# Patient Record
Sex: FEMALE | Race: WHITE | NOT HISPANIC OR LATINO | Employment: FULL TIME | URBAN - METROPOLITAN AREA
[De-identification: names, ages, dates, MRNs, and addresses within clinical notes are randomized per-mention and may not be internally consistent; named-entity substitution may affect disease eponyms.]

---
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
## 2025-01-07 ENCOUNTER — OFFICE VISIT (OUTPATIENT)
Dept: OBSTETRICS AND GYNECOLOGY | Facility: CLINIC | Age: 20
End: 2025-01-07
Payer: MEDICAID

## 2025-01-07 VITALS — SYSTOLIC BLOOD PRESSURE: 102 MMHG | DIASTOLIC BLOOD PRESSURE: 68 MMHG | WEIGHT: 199 LBS

## 2025-01-07 DIAGNOSIS — Z30.014 ENCOUNTER FOR INITIAL PRESCRIPTION OF INTRAUTERINE CONTRACEPTIVE DEVICE (IUD): ICD-10-CM

## 2025-01-07 PROBLEM — Z34.91 ENCOUNTER FOR SUPERVISION OF NORMAL PREGNANCY IN FIRST TRIMESTER: Status: RESOLVED | Noted: 2024-05-30 | Resolved: 2025-01-07

## 2025-01-07 PROCEDURE — 3074F SYST BP LT 130 MM HG: CPT | Mod: CPTII,,, | Performed by: STUDENT IN AN ORGANIZED HEALTH CARE EDUCATION/TRAINING PROGRAM

## 2025-01-07 PROCEDURE — 1159F MED LIST DOCD IN RCRD: CPT | Mod: CPTII,,, | Performed by: STUDENT IN AN ORGANIZED HEALTH CARE EDUCATION/TRAINING PROGRAM

## 2025-01-07 PROCEDURE — 3078F DIAST BP <80 MM HG: CPT | Mod: CPTII,,, | Performed by: STUDENT IN AN ORGANIZED HEALTH CARE EDUCATION/TRAINING PROGRAM

## 2025-01-07 PROCEDURE — 1160F RVW MEDS BY RX/DR IN RCRD: CPT | Mod: CPTII,,, | Performed by: STUDENT IN AN ORGANIZED HEALTH CARE EDUCATION/TRAINING PROGRAM

## 2025-01-07 NOTE — PROGRESS NOTES
Patient is a 19-year-old white female status post vaginal delivery present clinic for postpartum visit.  She is doing well today.  She is still having some vaginal bleeding from her delivery.  She is breast and formula feeding.  She is interested in the copper IUD for contraception.  She also reports some mild dysuria.  She has no other complaints today.  She denies VD/Denies any HA, vision changes, F/C, LE swelling. Denies any breast pain/soreness. Denies postpartum depression.     Vitals:    01/07/25 1508   BP: 102/68   Weight: 90.3 kg (199 lb)     There is no height or weight on file to calculate BMI.  Gen:WDWN in NAD  NC/AT  Non labored breathing  Abd soft, NT/ND  Pelvic NFEG no lesions no discharge  Vaginal walls pink moist well rugated no lesions  Skin: warm and dry  Ext no edema      Patient Active Problem List   Diagnosis    Encounter for supervision of normal pregnancy in first trimester    Rubella non-immune status, antepartum     Plan   Patient doing well postpartum   Patient released from postpartum restrictions   Patient counseled on contraception, she desires the copper IUD she has had this in the past with good results   ParaGard was ordered today, notify patient of arrival   Return to clinic for IUD insertion

## 2025-01-09 ENCOUNTER — PATIENT MESSAGE (OUTPATIENT)
Dept: OBSTETRICS AND GYNECOLOGY | Facility: CLINIC | Age: 20
End: 2025-01-09
Payer: MEDICAID

## 2025-05-19 ENCOUNTER — OFFICE VISIT (OUTPATIENT)
Dept: OBSTETRICS AND GYNECOLOGY | Facility: CLINIC | Age: 20
End: 2025-05-19
Payer: MEDICAID

## 2025-05-19 VITALS
BODY MASS INDEX: 41.82 KG/M2 | SYSTOLIC BLOOD PRESSURE: 113 MMHG | DIASTOLIC BLOOD PRESSURE: 70 MMHG | HEIGHT: 60 IN | HEART RATE: 76 BPM | WEIGHT: 213 LBS

## 2025-05-19 DIAGNOSIS — Z30.017 ENCOUNTER FOR INITIAL PRESCRIPTION OF IMPLANTABLE SUBDERMAL CONTRACEPTIVE: ICD-10-CM

## 2025-05-19 DIAGNOSIS — Z01.419 WELL WOMAN EXAM WITH ROUTINE GYNECOLOGICAL EXAM: Primary | ICD-10-CM

## 2025-05-19 RX ORDER — CYCLOBENZAPRINE HCL 5 MG
5 TABLET ORAL
COMMUNITY
Start: 2025-03-25

## 2025-05-19 RX ORDER — NITROFURANTOIN 25; 75 MG/1; MG/1
CAPSULE ORAL
COMMUNITY
Start: 2025-04-25

## 2025-05-19 RX ORDER — NORGESTIMATE AND ETHINYL ESTRADIOL 0.25-0.035
1 KIT ORAL
COMMUNITY
Start: 2025-04-17

## 2025-05-19 RX ORDER — IBUPROFEN 800 MG/1
TABLET, FILM COATED ORAL
COMMUNITY
Start: 2024-12-11

## 2025-05-19 RX ORDER — CHLORHEXIDINE GLUCONATE ORAL RINSE 1.2 MG/ML
SOLUTION DENTAL
COMMUNITY
Start: 2025-03-28

## 2025-05-19 NOTE — PROGRESS NOTES
Chief Complaint: Annual Exam    HPI: 20 y.o.  here for Annual Exam.  Patient doing well today.  She is interested in starting contraception.  She did miss her cycle in April, UPT is negative today in clinic.  We discussed the different types of contraception she is interested in the Nexplanon at this time.  She has no other complaints today.    Review of Systems   Constitutional:  Negative for chills and fever.   HENT:  Negative for congestion and sore throat.    Respiratory:  Negative for cough and shortness of breath.    Cardiovascular:  Negative for chest pain and palpitations.   Gastrointestinal:  Negative for abdominal pain, nausea and vomiting.   Genitourinary:  Negative for dysuria, frequency and urgency.   Musculoskeletal:  Negative for back pain.   Skin:  Negative for itching and rash.   Neurological:  Negative for headaches.   All other systems reviewed and are negative.      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   Contraception History: none  Social History: denies t/d/e  Family History denies breast, colon, ovarian, or uterine cancer  Review of patient's allergies indicates:   Allergen Reactions    Tramadol      Current Outpatient Medications   Medication Instructions    chlorhexidine (PERIDEX) 0.12 % solution SWISH 15ML FOR 30 SECONDS THEN SPIT OUT TWICE DAILY AS NEEDED FOR 7 DAYS    cyclobenzaprine (FLEXERIL) 5 mg    EScitalopram oxalate (LEXAPRO) 10 mg, Oral, Daily    ESTARYLLA 0.25-0.035 mg per tablet 1 tablet    ibuprofen (ADVIL,MOTRIN) 800 MG tablet     nitrofurantoin, macrocrystal-monohydrate, (MACROBID) 100 MG capsule TAKE 1 CAPSULE BY MOUTH EVERY 12 HOURS FOR 7 DAYS    prenatal vit37/iron/folic acid (PRENATA ORAL) 1 tablet, Daily     Physical Exam:  Vitals:     25 1040   BP: 113/70   Pulse: 76     Body mass index is 41.6 kg/m².  Physical Exam  Vitals reviewed. Exam conducted with a chaperone present.   Constitutional:       General: She is not in acute distress.     Appearance: Normal appearance. She is obese.   HENT:      Head: Normocephalic and atraumatic.   Eyes:      Extraocular Movements: Extraocular movements intact.      Pupils: Pupils are equal, round, and reactive to light.   Pulmonary:      Effort: Pulmonary effort is normal. No respiratory distress.   Abdominal:      General: There is no distension.      Palpations: Abdomen is soft.      Tenderness: There is no abdominal tenderness.   Musculoskeletal:         General: No swelling or tenderness. Normal range of motion.      Cervical back: Normal range of motion and neck supple.   Skin:     General: Skin is warm and dry.   Neurological:      General: No focal deficit present.      Mental Status: She is alert and oriented to person, place, and time.        ASSESSMENT:   Patient is a 20 y.o.  who presents for annual exam   Problem List[1]    PLAN:  Pap not indicated, will need next year  We discussed different types of contraception today, she is electing for the Nexplanon   Paperwork filled out today, notify patient of arrival scheduled for insertion  Pre-conception counseling- folic acid and PNV  Counseling on self-breast exams, diet, and exercise.  Return to clinic for Nexplanon insertion             [1]   Patient Active Problem List  Diagnosis    Rubella non-immune status, antepartum

## 2025-06-05 ENCOUNTER — TELEPHONE (OUTPATIENT)
Dept: OBSTETRICS AND GYNECOLOGY | Facility: CLINIC | Age: 20
End: 2025-06-05
Payer: MEDICAID

## 2025-06-12 ENCOUNTER — OFFICE VISIT (OUTPATIENT)
Dept: OBSTETRICS AND GYNECOLOGY | Facility: CLINIC | Age: 20
End: 2025-06-12
Payer: MEDICAID

## 2025-06-12 VITALS
HEART RATE: 66 BPM | BODY MASS INDEX: 41.56 KG/M2 | DIASTOLIC BLOOD PRESSURE: 79 MMHG | WEIGHT: 212.81 LBS | SYSTOLIC BLOOD PRESSURE: 115 MMHG

## 2025-06-12 DIAGNOSIS — N83.202 CYSTS OF BOTH OVARIES: Primary | ICD-10-CM

## 2025-06-12 DIAGNOSIS — N83.201 CYSTS OF BOTH OVARIES: Primary | ICD-10-CM

## 2025-06-12 PROCEDURE — 99213 OFFICE O/P EST LOW 20 MIN: CPT | Mod: S$PBB,,, | Performed by: STUDENT IN AN ORGANIZED HEALTH CARE EDUCATION/TRAINING PROGRAM

## 2025-06-12 PROCEDURE — 3074F SYST BP LT 130 MM HG: CPT | Mod: CPTII,,, | Performed by: STUDENT IN AN ORGANIZED HEALTH CARE EDUCATION/TRAINING PROGRAM

## 2025-06-12 PROCEDURE — 3008F BODY MASS INDEX DOCD: CPT | Mod: CPTII,,, | Performed by: STUDENT IN AN ORGANIZED HEALTH CARE EDUCATION/TRAINING PROGRAM

## 2025-06-12 PROCEDURE — 3078F DIAST BP <80 MM HG: CPT | Mod: CPTII,,, | Performed by: STUDENT IN AN ORGANIZED HEALTH CARE EDUCATION/TRAINING PROGRAM

## 2025-06-12 PROCEDURE — 1159F MED LIST DOCD IN RCRD: CPT | Mod: CPTII,,, | Performed by: STUDENT IN AN ORGANIZED HEALTH CARE EDUCATION/TRAINING PROGRAM

## 2025-06-12 PROCEDURE — 1160F RVW MEDS BY RX/DR IN RCRD: CPT | Mod: CPTII,,, | Performed by: STUDENT IN AN ORGANIZED HEALTH CARE EDUCATION/TRAINING PROGRAM

## 2025-06-12 NOTE — PROGRESS NOTES
Chief Complaint: ovarian cyst    HPI: Patient is a 20 y.o. y.o. female  who presents to GYN clinic for follow up.  Patient was seen in the ED with complaints of worsening right-sided pelvic pain.  She reports the pain wraps around to her back.  She would have a CT scan which showed a difficult positive for 2 x 2 cm right adnexal cystic area.  The left adnexa also had a 1 x 1 cm cystic mass.  She reports today that her pain resolved.  She had also told she had a UTI and was given antibiotics. she has no other complaints.  She her review of systems negative unless mentioned..    Physical Exam:  Vitals:    25 1416   BP: 115/79   Pulse: 66   Weight: 96.5 kg (212 lb 12.8 oz)     Gen: NAD, well developed, well nourished  Psych: alert and oriented x 3, normal affect  HEENT: normocephalic, atraumatic  Abd: soft, non-tender, non-distended  Skin: warm and dry  Ext: no c/c/c, moves all extremities  Neurological: normal gait, gross motor function intact    Results:  CT Abd/pelvic  Impression    There appears to be a right adnexal complex cystic mass, difficult to separate from adjacent fluid-filled small bowel and measure due to volume averaging with adjacent loops of fluid-filled small bowel, approximately 2.6 x 2.6 cm. This extends somewhat  superior to the uterus and there is a left adnexal cystic mass measuring 1.8 x 1.6 cm. Thickened endometrium.    If further evaluation is needed, pelvic ultrasound could be obtained.    Assessment: Patient is a 20 y.o. y.o. female  with  Problem List[1]    Plan:  Patient counseled on CT report and that ultrasound will be ordered to better assess her ovarian cyst   Her pain has resolved   She reports having UTI during her ED visit, will or UA today   Return to clinic for Nexplanon insertion and ultrasound results    Bar Redd MD             [1]   Patient Active Problem List  Diagnosis    Rubella non-immune status, antepartum

## 2025-06-17 ENCOUNTER — OFFICE VISIT (OUTPATIENT)
Dept: OBSTETRICS AND GYNECOLOGY | Facility: CLINIC | Age: 20
End: 2025-06-17
Payer: MEDICAID

## 2025-06-17 ENCOUNTER — PROCEDURE VISIT (OUTPATIENT)
Dept: OBSTETRICS AND GYNECOLOGY | Facility: CLINIC | Age: 20
End: 2025-06-17
Payer: MEDICAID

## 2025-06-17 VITALS
WEIGHT: 211.19 LBS | SYSTOLIC BLOOD PRESSURE: 120 MMHG | BODY MASS INDEX: 41.25 KG/M2 | DIASTOLIC BLOOD PRESSURE: 79 MMHG | HEART RATE: 80 BPM

## 2025-06-17 DIAGNOSIS — N83.202 CYSTS OF BOTH OVARIES: ICD-10-CM

## 2025-06-17 DIAGNOSIS — Z30.017 NEXPLANON INSERTION: Primary | ICD-10-CM

## 2025-06-17 DIAGNOSIS — N83.201 CYSTS OF BOTH OVARIES: ICD-10-CM

## 2025-06-17 NOTE — PROCEDURES
Insertion of Nexplanon    Date/Time: 6/17/2025 2:45 PM    Performed by: Bar Redd MD  Authorized by: Bar Redd MD    Consent:   Consent obtained:  Prior to procedure the appropriate consent was completed and verified  Consent given by:  Patient  Patient questions answered: yes    Patient agrees, verbalizes understanding, and wants to proceed: yes    Educational handouts given: yes    Instructions and paperwork completed: yes    Pre-Procedure:   Pre-procedure timeout performed: yes    Prepped with: povidone-iodine    Local anesthetic:  Lidocaine with epinephrine  The site was cleaned and prepped in a sterile fashion: yes     Closed with dermabond  Insertion Procedure:   Small stab incision was made in arm: yes    Left/right:  left arm   68 mg etonogestreL 68 mg  Preloaded Implanon trocar was placed subdermally: yes    Visualization of implant was obtained: yes    Nexplanon was inserted and trocar removed: yes    Visualization of notch in stilette and palpitation of device: yes    Palpation confirms placement by provider and patient: yes

## 2025-06-18 ENCOUNTER — PATIENT MESSAGE (OUTPATIENT)
Dept: OBSTETRICS AND GYNECOLOGY | Facility: CLINIC | Age: 20
End: 2025-06-18
Payer: MEDICAID

## 2025-06-18 ENCOUNTER — TELEPHONE (OUTPATIENT)
Dept: OBSTETRICS AND GYNECOLOGY | Facility: CLINIC | Age: 20
End: 2025-06-18
Payer: MEDICAID